# Patient Record
Sex: FEMALE | Race: ASIAN
[De-identification: names, ages, dates, MRNs, and addresses within clinical notes are randomized per-mention and may not be internally consistent; named-entity substitution may affect disease eponyms.]

---

## 2021-12-16 ENCOUNTER — HOSPITAL ENCOUNTER (OUTPATIENT)
Dept: HOSPITAL 61 - US | Age: 36
End: 2021-12-16
Attending: OBSTETRICS & GYNECOLOGY
Payer: COMMERCIAL

## 2021-12-16 DIAGNOSIS — N85.4: Primary | ICD-10-CM

## 2021-12-16 DIAGNOSIS — Z97.5: ICD-10-CM

## 2021-12-16 PROCEDURE — 76856 US EXAM PELVIC COMPLETE: CPT

## 2021-12-17 NOTE — RAD
EXAM: ULTRASOUND PELVIS



INDICATION: suprapubic pain.  Last menstrual period was 12/5/2021.



COMPARISON: None available.



TECHNIQUE: Transabdominal sonography was performed.



FINDINGS:

The uterus is retroflexed and measures 7.0 x 6.4 x 5.0 cm.  The endometrium measures 1.1 cm. There is
 no focal myometrial abnormality. IUD in place.



The right ovary measures 3.4 x 1.9 x 1.9 cm.  Normal vascularity



The left ovary measures 3.1 x 1.8 x 1.9 cm.  Normal vascularity



There is no free fluid.



IMPRESSION:



1. Physiologic appearance of the uterus and ovaries.

2. IUD in place.



Electronically signed by: Robin Estrada MD (12/17/2021 4:04 AM) LULI